# Patient Record
Sex: MALE | Race: BLACK OR AFRICAN AMERICAN | NOT HISPANIC OR LATINO | URBAN - METROPOLITAN AREA
[De-identification: names, ages, dates, MRNs, and addresses within clinical notes are randomized per-mention and may not be internally consistent; named-entity substitution may affect disease eponyms.]

---

## 2019-03-02 ENCOUNTER — EMERGENCY (EMERGENCY)
Facility: HOSPITAL | Age: 64
LOS: 0 days | Discharge: HOME | End: 2019-03-02
Payer: COMMERCIAL

## 2019-03-02 VITALS
WEIGHT: 169.98 LBS | DIASTOLIC BLOOD PRESSURE: 84 MMHG | TEMPERATURE: 98 F | RESPIRATION RATE: 16 BRPM | HEIGHT: 68 IN | OXYGEN SATURATION: 98 % | HEART RATE: 73 BPM | SYSTOLIC BLOOD PRESSURE: 164 MMHG

## 2019-03-02 VITALS
HEART RATE: 70 BPM | OXYGEN SATURATION: 98 % | RESPIRATION RATE: 14 BRPM | DIASTOLIC BLOOD PRESSURE: 75 MMHG | SYSTOLIC BLOOD PRESSURE: 150 MMHG

## 2019-03-02 DIAGNOSIS — L02.811 CUTANEOUS ABSCESS OF HEAD [ANY PART, EXCEPT FACE]: ICD-10-CM

## 2019-03-02 PROCEDURE — 10060 I&D ABSCESS SIMPLE/SINGLE: CPT

## 2019-03-02 PROCEDURE — 99283 EMERGENCY DEPT VISIT LOW MDM: CPT | Mod: 25

## 2019-03-02 NOTE — ED PROVIDER NOTE - CLINICAL SUMMARY MEDICAL DECISION MAKING FREE TEXT BOX
Abscess, patient is diabetic reports blood sugar well controlled at home, 90 at home prior to arrival. Plan for I&D, clindamycin, return in 2 days for wound check.

## 2019-03-02 NOTE — ED ADULT NURSE NOTE - OBJECTIVE STATEMENT
received ft c/o abscess to posterior scalp x 1 week seen at urgent care today with penicillin rx'd and recommended to come to er for I&d denies d/c from site pain worse with palpation

## 2019-03-02 NOTE — ED PROVIDER NOTE - OBJECTIVE STATEMENT
63M history of DM here with posterior scalp.  has not noted any drainage.  No fever, chills, nausea, vomiting. No trauma or injury.  Tetanus is up to date.

## 2019-05-16 ENCOUNTER — INPATIENT (INPATIENT)
Facility: HOSPITAL | Age: 64
LOS: 2 days | Discharge: HOME | End: 2019-05-19
Attending: INTERNAL MEDICINE | Admitting: INTERNAL MEDICINE
Payer: COMMERCIAL

## 2019-05-16 VITALS
HEART RATE: 95 BPM | WEIGHT: 169.98 LBS | SYSTOLIC BLOOD PRESSURE: 125 MMHG | RESPIRATION RATE: 19 BRPM | HEIGHT: 66 IN | TEMPERATURE: 101 F | DIASTOLIC BLOOD PRESSURE: 68 MMHG | OXYGEN SATURATION: 100 %

## 2019-05-16 PROCEDURE — 99285 EMERGENCY DEPT VISIT HI MDM: CPT | Mod: 25

## 2019-05-16 PROCEDURE — 93010 ELECTROCARDIOGRAM REPORT: CPT

## 2019-05-16 NOTE — ED ADULT NURSE NOTE - OBJECTIVE STATEMENT
pt a&o x3, pt states 1 episode of weakness and possible syncopal episode witnessed by wife today after dinner. As per wife " he wasn't responding to me". Pt reported feeling "not we'll". pt states felt dizziness, weakness but at time but did not fall. Pt denies pain at this time, symptoms resolved. Pmh DM, HTN. As per wife pt eyes closed. Pt wife poor historian to situation.

## 2019-05-16 NOTE — ED ADULT NURSE NOTE - CHIEF COMPLAINT QUOTE
BIBEMS due to generalized weakness and change in mental status @ 2100 witness by wife. pt is back to baseline A & O x 4. EMS reports AFIB on cardiac monitor pt on aspirin.

## 2019-05-16 NOTE — ED ADULT NURSE NOTE - NSIMPLEMENTINTERV_GEN_ALL_ED
Implemented All Universal Safety Interventions:  Leon to call system. Call bell, personal items and telephone within reach. Instruct patient to call for assistance. Room bathroom lighting operational. Non-slip footwear when patient is off stretcher. Physically safe environment: no spills, clutter or unnecessary equipment. Stretcher in lowest position, wheels locked, appropriate side rails in place.

## 2019-05-16 NOTE — ED ADULT NURSE NOTE - ED STAT RN HANDOFF DETAILS
general condition remains stable endorsed for continued care NSR on cardiac monitor Handoff given to RN Chela general condition remains stable endorsed for continued care NSR on cardiac monitor

## 2019-05-17 DIAGNOSIS — I48.91 UNSPECIFIED ATRIAL FIBRILLATION: ICD-10-CM

## 2019-05-17 DIAGNOSIS — E78.5 HYPERLIPIDEMIA, UNSPECIFIED: ICD-10-CM

## 2019-05-17 DIAGNOSIS — R91.8 OTHER NONSPECIFIC ABNORMAL FINDING OF LUNG FIELD: ICD-10-CM

## 2019-05-17 DIAGNOSIS — E11.8 TYPE 2 DIABETES MELLITUS WITH UNSPECIFIED COMPLICATIONS: ICD-10-CM

## 2019-05-17 DIAGNOSIS — I10 ESSENTIAL (PRIMARY) HYPERTENSION: ICD-10-CM

## 2019-05-17 PROBLEM — E11.9 TYPE 2 DIABETES MELLITUS WITHOUT COMPLICATIONS: Chronic | Status: ACTIVE | Noted: 2019-03-02

## 2019-05-17 LAB
ALBUMIN SERPL ELPH-MCNC: 3.7 G/DL — SIGNIFICANT CHANGE UP (ref 3.3–5)
ALP SERPL-CCNC: 56 U/L — SIGNIFICANT CHANGE UP (ref 40–120)
ALT FLD-CCNC: 27 U/L — SIGNIFICANT CHANGE UP (ref 12–78)
ANION GAP SERPL CALC-SCNC: 8 MMOL/L — SIGNIFICANT CHANGE UP (ref 5–17)
APTT BLD: 29.8 SEC — SIGNIFICANT CHANGE UP (ref 28.5–37)
AST SERPL-CCNC: 15 U/L — SIGNIFICANT CHANGE UP (ref 15–37)
BASOPHILS # BLD AUTO: 0.04 K/UL — SIGNIFICANT CHANGE UP (ref 0–0.2)
BASOPHILS NFR BLD AUTO: 0.4 % — SIGNIFICANT CHANGE UP (ref 0–2)
BILIRUB SERPL-MCNC: 0.3 MG/DL — SIGNIFICANT CHANGE UP (ref 0.2–1.2)
BUN SERPL-MCNC: 21 MG/DL — SIGNIFICANT CHANGE UP (ref 7–23)
CALCIUM SERPL-MCNC: 9.3 MG/DL — SIGNIFICANT CHANGE UP (ref 8.5–10.1)
CHLORIDE SERPL-SCNC: 111 MMOL/L — HIGH (ref 96–108)
CO2 SERPL-SCNC: 23 MMOL/L — SIGNIFICANT CHANGE UP (ref 22–31)
CREAT SERPL-MCNC: 1.16 MG/DL — SIGNIFICANT CHANGE UP (ref 0.5–1.3)
EOSINOPHIL # BLD AUTO: 0.14 K/UL — SIGNIFICANT CHANGE UP (ref 0–0.5)
EOSINOPHIL NFR BLD AUTO: 1.5 % — SIGNIFICANT CHANGE UP (ref 0–6)
GLUCOSE BLDC GLUCOMTR-MCNC: 144 MG/DL — HIGH (ref 70–99)
GLUCOSE BLDC GLUCOMTR-MCNC: 191 MG/DL — HIGH (ref 70–99)
GLUCOSE BLDC GLUCOMTR-MCNC: 192 MG/DL — HIGH (ref 70–99)
GLUCOSE BLDC GLUCOMTR-MCNC: 195 MG/DL — HIGH (ref 70–99)
GLUCOSE SERPL-MCNC: 137 MG/DL — HIGH (ref 70–99)
HCT VFR BLD CALC: 47.1 % — SIGNIFICANT CHANGE UP (ref 39–50)
HGB BLD-MCNC: 15.3 G/DL — SIGNIFICANT CHANGE UP (ref 13–17)
IMM GRANULOCYTES NFR BLD AUTO: 0.3 % — SIGNIFICANT CHANGE UP (ref 0–1.5)
INR BLD: 1.02 RATIO — SIGNIFICANT CHANGE UP (ref 0.88–1.16)
LYMPHOCYTES # BLD AUTO: 1.93 K/UL — SIGNIFICANT CHANGE UP (ref 1–3.3)
LYMPHOCYTES # BLD AUTO: 20.8 % — SIGNIFICANT CHANGE UP (ref 13–44)
MCHC RBC-ENTMCNC: 28.1 PG — SIGNIFICANT CHANGE UP (ref 27–34)
MCHC RBC-ENTMCNC: 32.5 GM/DL — SIGNIFICANT CHANGE UP (ref 32–36)
MCV RBC AUTO: 86.4 FL — SIGNIFICANT CHANGE UP (ref 80–100)
MONOCYTES # BLD AUTO: 0.62 K/UL — SIGNIFICANT CHANGE UP (ref 0–0.9)
MONOCYTES NFR BLD AUTO: 6.7 % — SIGNIFICANT CHANGE UP (ref 2–14)
NEUTROPHILS # BLD AUTO: 6.54 K/UL — SIGNIFICANT CHANGE UP (ref 1.8–7.4)
NEUTROPHILS NFR BLD AUTO: 70.3 % — SIGNIFICANT CHANGE UP (ref 43–77)
NRBC # BLD: 0 /100 WBCS — SIGNIFICANT CHANGE UP (ref 0–0)
NT-PROBNP SERPL-SCNC: 436 PG/ML — HIGH (ref 0–125)
PLATELET # BLD AUTO: 206 K/UL — SIGNIFICANT CHANGE UP (ref 150–400)
POTASSIUM SERPL-MCNC: 4.3 MMOL/L — SIGNIFICANT CHANGE UP (ref 3.5–5.3)
POTASSIUM SERPL-SCNC: 4.3 MMOL/L — SIGNIFICANT CHANGE UP (ref 3.5–5.3)
PROT SERPL-MCNC: 7 GM/DL — SIGNIFICANT CHANGE UP (ref 6–8.3)
PROTHROM AB SERPL-ACNC: 11.4 SEC — SIGNIFICANT CHANGE UP (ref 10–12.9)
RBC # BLD: 5.45 M/UL — SIGNIFICANT CHANGE UP (ref 4.2–5.8)
RBC # FLD: 14.3 % — SIGNIFICANT CHANGE UP (ref 10.3–14.5)
SODIUM SERPL-SCNC: 142 MMOL/L — SIGNIFICANT CHANGE UP (ref 135–145)
TROPONIN I SERPL-MCNC: <.015 NG/ML — SIGNIFICANT CHANGE UP (ref 0.01–0.04)
WBC # BLD: 9.3 K/UL — SIGNIFICANT CHANGE UP (ref 3.8–10.5)
WBC # FLD AUTO: 9.3 K/UL — SIGNIFICANT CHANGE UP (ref 3.8–10.5)

## 2019-05-17 PROCEDURE — 70450 CT HEAD/BRAIN W/O DYE: CPT | Mod: 26

## 2019-05-17 PROCEDURE — 93880 EXTRACRANIAL BILAT STUDY: CPT | Mod: 26

## 2019-05-17 PROCEDURE — 71275 CT ANGIOGRAPHY CHEST: CPT | Mod: 26

## 2019-05-17 PROCEDURE — 99223 1ST HOSP IP/OBS HIGH 75: CPT

## 2019-05-17 PROCEDURE — 93306 TTE W/DOPPLER COMPLETE: CPT | Mod: 26

## 2019-05-17 RX ORDER — APIXABAN 2.5 MG/1
5 TABLET, FILM COATED ORAL EVERY 12 HOURS
Refills: 0 | Status: DISCONTINUED | OUTPATIENT
Start: 2019-05-17 | End: 2019-05-18

## 2019-05-17 RX ORDER — DEXTROSE 50 % IN WATER 50 %
15 SYRINGE (ML) INTRAVENOUS ONCE
Refills: 0 | Status: DISCONTINUED | OUTPATIENT
Start: 2019-05-17 | End: 2019-05-19

## 2019-05-17 RX ORDER — DORZOLAMIDE HYDROCHLORIDE TIMOLOL MALEATE 20; 5 MG/ML; MG/ML
1 SOLUTION/ DROPS OPHTHALMIC
Qty: 0 | Refills: 0 | DISCHARGE

## 2019-05-17 RX ORDER — DEXTROSE 50 % IN WATER 50 %
25 SYRINGE (ML) INTRAVENOUS ONCE
Refills: 0 | Status: DISCONTINUED | OUTPATIENT
Start: 2019-05-17 | End: 2019-05-19

## 2019-05-17 RX ORDER — RAMIPRIL 5 MG
1 CAPSULE ORAL
Qty: 0 | Refills: 0 | DISCHARGE

## 2019-05-17 RX ORDER — DORZOLAMIDE HYDROCHLORIDE TIMOLOL MALEATE 20; 5 MG/ML; MG/ML
1 SOLUTION/ DROPS OPHTHALMIC
Refills: 0 | Status: DISCONTINUED | OUTPATIENT
Start: 2019-05-17 | End: 2019-05-19

## 2019-05-17 RX ORDER — LATANOPROST 0.05 MG/ML
1 SOLUTION/ DROPS OPHTHALMIC; TOPICAL AT BEDTIME
Refills: 0 | Status: DISCONTINUED | OUTPATIENT
Start: 2019-05-17 | End: 2019-05-19

## 2019-05-17 RX ORDER — GLUCAGON INJECTION, SOLUTION 0.5 MG/.1ML
1 INJECTION, SOLUTION SUBCUTANEOUS ONCE
Refills: 0 | Status: DISCONTINUED | OUTPATIENT
Start: 2019-05-17 | End: 2019-05-19

## 2019-05-17 RX ORDER — ATORVASTATIN CALCIUM 80 MG/1
10 TABLET, FILM COATED ORAL AT BEDTIME
Refills: 0 | Status: DISCONTINUED | OUTPATIENT
Start: 2019-05-17 | End: 2019-05-19

## 2019-05-17 RX ORDER — SODIUM CHLORIDE 9 MG/ML
1000 INJECTION, SOLUTION INTRAVENOUS
Refills: 0 | Status: DISCONTINUED | OUTPATIENT
Start: 2019-05-17 | End: 2019-05-19

## 2019-05-17 RX ORDER — RAMIPRIL 5 MG
0 CAPSULE ORAL
Qty: 0 | Refills: 0 | DISCHARGE

## 2019-05-17 RX ORDER — ERGOCALCIFEROL 1.25 MG/1
1 CAPSULE ORAL
Qty: 0 | Refills: 0 | DISCHARGE

## 2019-05-17 RX ORDER — ASPIRIN/CALCIUM CARB/MAGNESIUM 324 MG
81 TABLET ORAL DAILY
Refills: 0 | Status: DISCONTINUED | OUTPATIENT
Start: 2019-05-17 | End: 2019-05-19

## 2019-05-17 RX ORDER — SODIUM CHLORIDE 9 MG/ML
1000 INJECTION INTRAMUSCULAR; INTRAVENOUS; SUBCUTANEOUS ONCE
Refills: 0 | Status: COMPLETED | OUTPATIENT
Start: 2019-05-17 | End: 2019-05-17

## 2019-05-17 RX ORDER — LATANOPROST 0.05 MG/ML
1 SOLUTION/ DROPS OPHTHALMIC; TOPICAL
Qty: 0 | Refills: 0 | DISCHARGE

## 2019-05-17 RX ORDER — INSULIN LISPRO 100/ML
VIAL (ML) SUBCUTANEOUS
Refills: 0 | Status: DISCONTINUED | OUTPATIENT
Start: 2019-05-17 | End: 2019-05-19

## 2019-05-17 RX ORDER — ENOXAPARIN SODIUM 100 MG/ML
40 INJECTION SUBCUTANEOUS EVERY 24 HOURS
Refills: 0 | Status: DISCONTINUED | OUTPATIENT
Start: 2019-05-17 | End: 2019-05-17

## 2019-05-17 RX ORDER — DEXTROSE 50 % IN WATER 50 %
12.5 SYRINGE (ML) INTRAVENOUS ONCE
Refills: 0 | Status: DISCONTINUED | OUTPATIENT
Start: 2019-05-17 | End: 2019-05-19

## 2019-05-17 RX ORDER — ASPIRIN/CALCIUM CARB/MAGNESIUM 324 MG
1 TABLET ORAL
Qty: 0 | Refills: 0 | DISCHARGE

## 2019-05-17 RX ORDER — LISINOPRIL 2.5 MG/1
40 TABLET ORAL DAILY
Refills: 0 | Status: DISCONTINUED | OUTPATIENT
Start: 2019-05-17 | End: 2019-05-19

## 2019-05-17 RX ORDER — RAMIPRIL 5 MG
10 CAPSULE ORAL
Qty: 0 | Refills: 0 | DISCHARGE

## 2019-05-17 RX ADMIN — ENOXAPARIN SODIUM 40 MILLIGRAM(S): 100 INJECTION SUBCUTANEOUS at 10:45

## 2019-05-17 RX ADMIN — LATANOPROST 1 DROP(S): 0.05 SOLUTION/ DROPS OPHTHALMIC; TOPICAL at 21:41

## 2019-05-17 RX ADMIN — ATORVASTATIN CALCIUM 10 MILLIGRAM(S): 80 TABLET, FILM COATED ORAL at 21:41

## 2019-05-17 RX ADMIN — Medication 1: at 10:55

## 2019-05-17 RX ADMIN — Medication 1: at 16:44

## 2019-05-17 RX ADMIN — Medication 81 MILLIGRAM(S): at 10:45

## 2019-05-17 RX ADMIN — DORZOLAMIDE HYDROCHLORIDE TIMOLOL MALEATE 1 DROP(S): 20; 5 SOLUTION/ DROPS OPHTHALMIC at 17:10

## 2019-05-17 RX ADMIN — APIXABAN 5 MILLIGRAM(S): 2.5 TABLET, FILM COATED ORAL at 17:28

## 2019-05-17 RX ADMIN — LISINOPRIL 40 MILLIGRAM(S): 2.5 TABLET ORAL at 10:54

## 2019-05-17 RX ADMIN — SODIUM CHLORIDE 1000 MILLILITER(S): 9 INJECTION INTRAMUSCULAR; INTRAVENOUS; SUBCUTANEOUS at 03:22

## 2019-05-17 NOTE — H&P ADULT - ASSESSMENT
Patient is a 63 y/o M PMHx DM2, HTN, HLD, Prostate CA (s/p RT only in 2016), Glaucoma presents with sudden onset of weakness/SOB which occurred last night and found to be in Afib here

## 2019-05-17 NOTE — H&P ADULT - NSICDXPASTMEDICALHX_GEN_ALL_CORE_FT
PAST MEDICAL HISTORY:  Diabetes     Glaucoma     HTN (hypertension)     Hyperlipidemia     Prostate cancer

## 2019-05-17 NOTE — ED PROVIDER NOTE - PHYSICAL EXAMINATION
Gen: Alert, NAD, well appearing  Head: NC, AT, PERRL, EOMI, normal lids/conjunctiva  ENT: normal hearing, patent oropharynx without erythema/exudate, uvula midline  Neck: +supple, no tenderness/meningismus/JVD, +Trachea midline  Pulm: Bilateral BS, normal resp effort, no wheeze/stridor/retractions  CV: irregular, no M/R/G, +dist pulses  Abd: soft, NT/ND, +BS, no palpable masses  Mskel: no edema/erythema/cyanosis  Skin: no rash, warm/dry  Neuro: AAOx3, no apparent sensory/motor deficits, coordination intact

## 2019-05-17 NOTE — H&P ADULT - NSHPLABSRESULTS_GEN_ALL_CORE
15.3   9.30  )-----------( 206      ( 17 May 2019 03:24 )             47.1     05-17    142  |  111<H>  |  21  ----------------------------<  137<H>  4.3   |  23  |  1.16    Ca    9.3      17 May 2019 04:09    TPro  7.0  /  Alb  3.7  /  TBili  0.3  /  DBili  x   /  AST  15  /  ALT  27  /  AlkPhos  56  05-17    CARDIAC MARKERS ( 17 May 2019 04:09 )  <.015 ng/mL / x     / x     / x     / x              PT/INR - ( 17 May 2019 04:09 )   PT: 11.4 sec;   INR: 1.02 ratio         PTT - ( 17 May 2019 04:09 )  PTT:29.8 sec    Labs are notable for: CBC, Coags, CMP all unremarkable, Troponin negative,  mildly elevated and not significant given lack of symptoms    CTA Chest - no pe and incidental findings: 4mm RLL and 4mm LLL nodules, mild atherosclerotic changes in aorta and coronaries, DJD spine, bochdalek hernia - all results d/w pt and advised he f/u with his PMD for these findings and to make sure he follows up on the lung nodules    CT head - no acute findings, carotid athersclerotic changes 15.3   9.30  )-----------( 206      ( 17 May 2019 03:24 )             47.1     05-17    142  |  111<H>  |  21  ----------------------------<  137<H>  4.3   |  23  |  1.16    Ca    9.3      17 May 2019 04:09    TPro  7.0  /  Alb  3.7  /  TBili  0.3  /  DBili  x   /  AST  15  /  ALT  27  /  AlkPhos  56  05-17    CARDIAC MARKERS ( 17 May 2019 04:09 )  <.015 ng/mL / x     / x     / x     / x              PT/INR - ( 17 May 2019 04:09 )   PT: 11.4 sec;   INR: 1.02 ratio         PTT - ( 17 May 2019 04:09 )  PTT:29.8 sec    Labs are notable for: CBC, Coags, CMP all unremarkable, Troponin negative,  mildly elevated and not significant given lack of symptoms    CTA Chest - no pe and incidental findings: 4mm RLL and 4mm LLL nodules, mild atherosclerotic changes in aorta and coronaries, DJD spine, bochdalek hernia - all results d/w pt and advised he f/u with his PMD for these findings and to make sure he follows up on the lung nodules    CT head - no acute findings, carotid atherosclerotic changes    EKG: Afib /min

## 2019-05-17 NOTE — PATIENT PROFILE ADULT - STATED REASON FOR ADMISSION
fter yesterday after I had dinner I headed downstairs I FELT FAINTISH AND SHORT OF BREATH AND OUT OF IT. WHEN THEY DID WHAT THEY DID THEY SAW MY HEART WAS BEATING IRREGULA

## 2019-05-17 NOTE — H&P ADULT - NSHPPHYSICALEXAM_GEN_ALL_CORE
PHYSICAL EXAM:    T(C): 36.6 (05-17-19 @ 07:19), Max: 38.1 (05-16-19 @ 22:16)  HR: 98 (05-17-19 @ 07:19) (90 - 108)  BP: 129/82 (05-17-19 @ 07:19) (110/66 - 129/82)  RR: 15 (05-17-19 @ 07:19) (15 - 19)  SpO2: 98% (05-17-19 @ 07:19) (98% - 100%)  Wt(kg): --    Constitutional: NAD     Eyes: PERRLA, Normal sclera/conjunctiva    Neck: supple no JVD, no palpable adenopathy    Breasts: deferred    Back: unremarkable    Respiratory: CTA B/L, no wheezing, or rhonci    Cardiovascular: S1, S2 no murmurs, rubs or gallops, Radial pulses +2 bilaterally    Gastrointestinal: soft, non-tender, + Bowel sounds, no rebound or guarding    Genitourinary: deferred    Rectal: deferred    Extremities: non-tender, no edema, no calf tenderness bilaterally    Neurological: AAO x 3 no focal deficits    Skin: no rashes noted    Musculoskeletal: normal ROM grossly    Psychiatric: appropriate affect, normal speech

## 2019-05-17 NOTE — H&P ADULT - PROBLEM SELECTOR PLAN 5
incidentally noted on CTA chest, pt advised to get the report of the CT and see his PMD for a repeat CT chest to monitor these and pt stated he will do so

## 2019-05-17 NOTE — CONSULT NOTE ADULT - SUBJECTIVE AND OBJECTIVE BOX
CHIEF COMPLAINT:  Patient is a 64y old  Male who presents with a chief complaint of weakness/SOB (17 May 2019 10:20)      HPI:  Patient is a 63 y/o M DM2, HTN, HLD, Prostate CA (s/p RT only in 2016), Glaucoma presents with sudden onset of weakness/SOB which occurred last night. Pt did not feel any CP or palpitations. When he came to the ED he was found to be in AFIB with RVR to 116 which improved spontaneously to NSR on the monitor without any rate controlling agent and he was treated with IVF only. Now feels well and reports no symptoms. He denied ever having a cardiac workup in the past apart from EKGs which he states were normal in the past. Pt denied CP or SOB on exertion and denied palpitations.     ALLERGIES:  No Known Allergies    Intolerances  lactose (Diarrhea)    Home Medications:  aspirin 81 mg oral tablet: 1 tab(s) orally once a day (17 May 2019 08:11)  glyburide-metformin: 1  orally once a day (17 May 2019 08:11)  latanoprost 0.005% ophthalmic solution: 1 drop(s) to each affected eye once a day (in the evening) (17 May 2019 08:11)  pravastatin 20 mg oral tablet: 1 tab(s) orally once a day (17 May 2019 10:19)  ramipril: 1  orally once a day (17 May 2019 08:11)  timolol-dorzolamide 0.5%-2% preservative-free ophthalmic solution: 1 drop(s) to each affected eye 2 times a day (17 May 2019 08:11)  Vitamin D2 2000 intl units oral capsule: 1 cap(s) orally once a day (17 May 2019 08:11)        PAST MEDICAL & SURGICAL HISTORY:  Glaucoma  Prostate cancer  Hyperlipidemia  HTN (hypertension)  Diabetes  No significant past surgical history      FAMILY HISTORY:  Family history of cardiac disorder: mother      SOCIAL HISTORY:  denied any tobacco, he reports "very little" ETOH and denied any drug use    REVIEW OF SYSTEMS:  General:  No wt loss, fevers, chills, night sweats  Eyes:  Good vision, no reported pain  ENT:  No sore throat, pain, runny nose, dysphagia  CV:  No pain, palpitations, hypo/hypertension  Resp:  No dyspnea, cough, tachypnea, wheezing  GI:  No pain, nausea, vomiting, diarrhea, constipation  :  No pain, bleeding, incontinence, nocturia  Muscle:  No pain, weakness  Breast:  No pain, abscess, mass, discharge  Neuro:  No weakness, tingling, memory problems  Psych:  No fatigue, insomnia, mood problems, depression  Endocrine:  No polyuria, polydipsia, cold/heat intolerance  Heme:  No petechiae, ecchymosis, easy bruisability  Skin:  No rash, edema    PHYSICAL EXAM:  Vital Signs:  Vital Signs Last 24 Hrs  T(C): 36.8 (17 May 2019 12:49), Max: 38.1 (16 May 2019 22:16)  T(F): 98.3 (17 May 2019 12:49), Max: 100.5 (16 May 2019 22:16)  HR: 76 (17 May 2019 12:49) (76 - 108)  BP: 126/84 (17 May 2019 12:49) (110/66 - 134/75)  RR: 18 (17 May 2019 12:49) (15 - 19)  SpO2: 98% (17 May 2019 12:49) (98% - 100%)  I&O's Summary      Tele: SR  General:  Appears stated age, well-groomed, well-nourished, no distress  HEENT:  NC/AT, patent nares w/ pink mucosa, OP clear w/o lesions, PERRL, EOMI, conjunctivae clear, no thyromegaly, nodules, adenopathy, no JVD  Chest:  Full & symmetric excursion, no increased effort, breath sounds clear  Cardiovascular:  Regular rhythm, S1, S2, no murmur  Abdomen:  Soft, non-tender, non-distended, normoactive bowel sounds  Extremities:  no edema  Skin:  No rash. skin is warm/dry  Musculoskeletal:  Full ROM in all joints w/o swelling/tenderness/effusion  Neuro/Psych:  Alert, oriented  LABORATORY:                          15.3   9.30  )-----------( 206      ( 17 May 2019 03:24 )             47.1     05-17    142  |  111<H>  |  21  ----------------------------<  137<H>  4.3   |  23  |  1.16    Ca    9.3      17 May 2019 04:09    TPro  7.0  /  Alb  3.7  /  TBili  0.3  /  DBili  x   /  AST  15  /  ALT  27  /  AlkPhos  56  05-17      CARDIAC MARKERS ( 17 May 2019 12:45 )  <.015 ng/mL / x     / x     / x     / x      CARDIAC MARKERS ( 17 May 2019 04:09 )  <.015 ng/mL / x     / x     / x     / x          CAPILLARY BLOOD GLUCOSE  POCT Blood Glucose.: 191 mg/dL (17 May 2019 16:01)  POCT Blood Glucose.: 195 mg/dL (17 May 2019 10:41)  POCT Blood Glucose.: 144 mg/dL (17 May 2019 07:25)    LIVER FUNCTIONS - ( 17 May 2019 04:09 )  Alb: 3.7 g/dL / Pro: 7.0 gm/dL / ALK PHOS: 56 U/L / ALT: 27 U/L / AST: 15 U/L / GGT: x           PT/INR - ( 17 May 2019 04:09 )   PT: 11.4 sec;   INR: 1.02 ratio         PTT - ( 17 May 2019 04:09 )  PTT:29.8 sec    BNPSerum Pro-Brain Natriuretic Peptide: 436 pg/mL (05-17-19 @ 04:09)      IMAGING:  < from: 12 Lead ECG (05.16.19 @ 22:24) >    Diagnosis Line Atrial fibrillation with rapid ventricular response  Abnormal ECG  No previous ECGs available    < end of copied text >    < from: US Duplex Carotid Arteries Complete, Bilateral (05.17.19 @ 12:55) >  No duplex evidence of hemodynamically significant internal carotid artery   stenosis.      < end of copied text >      < from: CT Angio Chest w/ IV Cont (05.17.19 @ 06:23) >  No pulmonary embolism; distal subsegmental branches at the lung bases   degraded by motion.    No acute parenchymal or pleural lung disease.    < end of copied text >    < from: CT Head No Cont (05.17.19 @ 06:21) >  No acute intracranial hemorrhage, mass effect, or CT evidence of an acute   vascular territorial infarct.    < end of copied text >      ASSESSMENT:  Patient is a 63 y/o M DM2, HTN, HLD, Prostate CA (s/p RT only in 2016), Glaucoma presents with sudden onset of weakness/SOB which occurred last night. Pt did not feel any CP or palpitations. When he came to the ED he was found to be in AFIB with RVR to 116 which improved spontaneously to NSR on the monitor without any rate controlling agent and he was treated with IVF only. Now feels well and reports no symptoms. He denied ever having a cardiac workup in the past apart from EKGs which he states were normal in the past. Pt denied CP or SOB on exertion and denied palpitations.     PLAN:     MEDICATIONS  (STANDING):  aspirin  chewable 81 milliGRAM(s) Oral daily  atorvastatin 10 milliGRAM(s) Oral at bedtime  dorzolamide 2%/timolol 0.5% Ophthalmic Solution 1 Drop(s) Both EYES two times a day  enoxaparin Injectable 40 milliGRAM(s) SubCutaneous every 24 hours  insulin lispro (HumaLOG) corrective regimen sliding scale   SubCutaneous three times a day before meals  latanoprost 0.005% Ophthalmic Solution 1 Drop(s) Both EYES at bedtime  lisinopril 40 milliGRAM(s) Oral daily    Monitor heart rhythm on Tele.  echo pending.  Pt likely will benefit from stroke risk reduction with staring NOAC.    Lennox Yanez MD, FACC, FASE, FASNC, FACP  Director, Heart Failure Services  [facility]  , Department of Cardiology  [facility] CHIEF COMPLAINT:  Patient is a 64y old  Male who presents with a chief complaint of weakness/SOB (17 May 2019 10:20)      HPI:  Patient is a 65 y/o M DM2, HTN, HLD, Prostate CA (s/p RT only in 2016), Glaucoma presents with sudden onset of weakness/SOB which occurred last night. Pt did not feel any CP or palpitations. When he came to the ED he was found to be in AFIB with RVR to 116 which improved spontaneously to NSR on the monitor without any rate controlling agent and he was treated with IVF only. Now feels well and reports no symptoms. He denied ever having a cardiac workup in the past apart from EKGs which he states were normal in the past. Pt denied CP or SOB on exertion and denied palpitations. May have had similar type episode a few years ago.    ALLERGIES:  No Known Allergies    Intolerances  lactose (Diarrhea)    Home Medications:  aspirin 81 mg oral tablet: 1 tab(s) orally once a day (17 May 2019 08:11)  glyburide-metformin: 1  orally once a day (17 May 2019 08:11)  latanoprost 0.005% ophthalmic solution: 1 drop(s) to each affected eye once a day (in the evening) (17 May 2019 08:11)  pravastatin 20 mg oral tablet: 1 tab(s) orally once a day (17 May 2019 10:19)  ramipril: 1  orally once a day (17 May 2019 08:11)  timolol-dorzolamide 0.5%-2% preservative-free ophthalmic solution: 1 drop(s) to each affected eye 2 times a day (17 May 2019 08:11)  Vitamin D2 2000 intl units oral capsule: 1 cap(s) orally once a day (17 May 2019 08:11)        PAST MEDICAL & SURGICAL HISTORY:  Glaucoma  Prostate cancer  Hyperlipidemia  HTN (hypertension)  Diabetes  No significant past surgical history      FAMILY HISTORY:  Family history of cardiac disorder: mother      SOCIAL HISTORY:  denied any tobacco, he reports "very little" ETOH and denied any drug use    REVIEW OF SYSTEMS:  General:  No wt loss, fevers, chills, night sweats  Eyes:  Good vision, no reported pain  ENT:  No sore throat, pain, runny nose, dysphagia  CV:  No pain, palpitations, hypo/hypertension  Resp:  No dyspnea, cough, tachypnea, wheezing  GI:  No pain, nausea, vomiting, diarrhea, constipation  :  No pain, bleeding, incontinence, nocturia  Muscle:  No pain, weakness  Breast:  No pain, abscess, mass, discharge  Neuro:  No weakness, tingling, memory problems  Psych:  No fatigue, insomnia, mood problems, depression  Endocrine:  No polyuria, polydipsia, cold/heat intolerance  Heme:  No petechiae, ecchymosis, easy bruisability  Skin:  No rash, edema    PHYSICAL EXAM:  Vital Signs:  Vital Signs Last 24 Hrs  T(C): 36.8 (17 May 2019 12:49), Max: 38.1 (16 May 2019 22:16)  T(F): 98.3 (17 May 2019 12:49), Max: 100.5 (16 May 2019 22:16)  HR: 76 (17 May 2019 12:49) (76 - 108)  BP: 126/84 (17 May 2019 12:49) (110/66 - 134/75)  RR: 18 (17 May 2019 12:49) (15 - 19)  SpO2: 98% (17 May 2019 12:49) (98% - 100%)  I&O's Summary      Tele: SR  General:  Appears stated age, well-groomed, well-nourished, no distress  HEENT:  NC/AT, patent nares w/ pink mucosa, OP clear w/o lesions, PERRL, EOMI, conjunctivae clear, no thyromegaly, nodules, adenopathy, no JVD  Chest:  Full & symmetric excursion, no increased effort, breath sounds clear  Cardiovascular:  Regular rhythm, S1, S2, no murmur  Abdomen:  Soft, non-tender, non-distended, normoactive bowel sounds  Extremities:  no edema  Skin:  No rash. skin is warm/dry  Musculoskeletal:  Full ROM in all joints w/o swelling/tenderness/effusion  Neuro/Psych:  Alert, oriented  LABORATORY:                          15.3   9.30  )-----------( 206      ( 17 May 2019 03:24 )             47.1     05-17    142  |  111<H>  |  21  ----------------------------<  137<H>  4.3   |  23  |  1.16    Ca    9.3      17 May 2019 04:09    TPro  7.0  /  Alb  3.7  /  TBili  0.3  /  DBili  x   /  AST  15  /  ALT  27  /  AlkPhos  56  05-17      CARDIAC MARKERS ( 17 May 2019 12:45 )  <.015 ng/mL / x     / x     / x     / x      CARDIAC MARKERS ( 17 May 2019 04:09 )  <.015 ng/mL / x     / x     / x     / x          CAPILLARY BLOOD GLUCOSE  POCT Blood Glucose.: 191 mg/dL (17 May 2019 16:01)  POCT Blood Glucose.: 195 mg/dL (17 May 2019 10:41)  POCT Blood Glucose.: 144 mg/dL (17 May 2019 07:25)    LIVER FUNCTIONS - ( 17 May 2019 04:09 )  Alb: 3.7 g/dL / Pro: 7.0 gm/dL / ALK PHOS: 56 U/L / ALT: 27 U/L / AST: 15 U/L / GGT: x           PT/INR - ( 17 May 2019 04:09 )   PT: 11.4 sec;   INR: 1.02 ratio         PTT - ( 17 May 2019 04:09 )  PTT:29.8 sec    BNPSerum Pro-Brain Natriuretic Peptide: 436 pg/mL (05-17-19 @ 04:09)      IMAGING:  < from: 12 Lead ECG (05.16.19 @ 22:24) >    Diagnosis Line Atrial fibrillation with rapid ventricular response  Abnormal ECG  No previous ECGs available    < end of copied text >    < from: US Duplex Carotid Arteries Complete, Bilateral (05.17.19 @ 12:55) >  No duplex evidence of hemodynamically significant internal carotid artery   stenosis.      < end of copied text >      < from: CT Angio Chest w/ IV Cont (05.17.19 @ 06:23) >  No pulmonary embolism; distal subsegmental branches at the lung bases   degraded by motion.    No acute parenchymal or pleural lung disease.    < end of copied text >    < from: CT Head No Cont (05.17.19 @ 06:21) >  No acute intracranial hemorrhage, mass effect, or CT evidence of an acute   vascular territorial infarct.    < end of copied text >      ASSESSMENT:  Patient is a 65 y/o M DM2, HTN, HLD, Prostate CA (s/p RT only in 2016), Glaucoma presents with sudden onset of weakness/SOB which occurred last night. Pt did not feel any CP or palpitations. When he came to the ED he was found to be in AFIB with RVR to 116 which improved spontaneously to NSR on the monitor without any rate controlling agent and he was treated with IVF only. Now feels well and reports no symptoms. He denied ever having a cardiac workup in the past apart from EKGs which he states were normal in the past. Pt denied CP or SOB on exertion and denied palpitations. Likely elevated risk of PAF going forward. Explained to pt and his sister at bedside.    PLAN:     MEDICATIONS  (STANDING):  aspirin  chewable 81 milliGRAM(s) Oral daily  atorvastatin 10 milliGRAM(s) Oral at bedtime  dorzolamide 2%/timolol 0.5% Ophthalmic Solution 1 Drop(s) Both EYES two times a day  enoxaparin Injectable 40 milliGRAM(s) SubCutaneous every 24 hours  insulin lispro (HumaLOG) corrective regimen sliding scale   SubCutaneous three times a day before meals  latanoprost 0.005% Ophthalmic Solution 1 Drop(s) Both EYES at bedtime  lisinopril 40 milliGRAM(s) Oral daily    Monitor heart rhythm on Tele.  echo pending.  Pt likely will benefit from stroke risk reduction with staring NOAC.  Will add Eliquis 5 mg BID  Risks/benefits/alternatives explained and he verbalized understanding and agreement.    Lennox Yanez MD, FACC, SYBIL, YARELY, FACP  Director, Heart Failure Services  [facility]  , Department of Cardiology  [facility]

## 2019-05-17 NOTE — H&P ADULT - NSHPREVIEWOFSYSTEMS_GEN_ALL_CORE
REVIEW OF SYSTEMS:    CONSTITUTIONAL:  No fever, chills, +significant weakness which is now improved  HEENT:  Eyes:  No pain or redness in the eyes. Ears, Nose, Throat:  No runny nose or sore throat.  SKIN:  No rash   CARDIOVASCULAR:  No chest pain, chest pressure or chest discomfort. No palpitations or edema.  RESPIRATORY:  No shortness of breath now, no cough or sputum, no wheezing  GASTROINTESTINAL:  No nausea, vomiting or diarrhea. No abdominal pain  GENITOURINARY:  No Burning on urination.  NEUROLOGICAL:  No dizziness, or syncope  MUSCULOSKELETAL:  No joint pain or swelling.  HEMATOLOGIC:  No bleeding or bruising.  PSYCHIATRIC:  No significant depression

## 2019-05-17 NOTE — ED PROVIDER NOTE - CLINICAL SUMMARY MEDICAL DECISION MAKING FREE TEXT BOX
Patient with transient altered mental status and shortness of breath.  Lab values reviewed, there are no values which require acute intervention.  CT imaging negative for CVA or PE.  Has new onset atrial fib.  Patient is to be admitted to the hospital and the case was discussed with the admitting physician.  Any changes in plan, additional imaging/labs, and further work up will be at the discretion of the admitting physician.

## 2019-05-17 NOTE — H&P ADULT - PROBLEM SELECTOR PLAN 1
pt's rate is currently spontaneously well controlled and he is hemodynamically stable  -CHADs-VASC score is 2  -Cardiology consult already called await cardiology regarding need for anti-coagulation and any rate controlling agent  -check TTE and Carotid ultrasound (given carotid atherosclerotic changes seen on CT head)  -monitor on Tele and trend Marcell

## 2019-05-17 NOTE — ED PROVIDER NOTE - OBJECTIVE STATEMENT
Pertinent PMH/PSH/FHx/SHx and Review of Systems contained within:  Patient presents to the ED for weakness.  Patient had eaten dinner and was about to go down the stairs when he felt sudden onset diffuse weakness and shortness of breath.  Patient sat down, was noted to have shallow breathing, wife was trying to talk to him, he was awake but not responsive.  Patient does not recall being unresponsive.  Episode lasted about 5 minutes, patient is now back to baseline denying headache, focal numbness, chest pain, palpitations, shortness of breath, leg swelling.  No recent fever or illness.  Patient noted to have atrial fib in ER, there is no prior history.  Patient denies EtOH/tobacco/illicit substance use.    ROS: No fever/chills, No headache/photophobia/eye pain/changes in vision, No ear pain/sore throat/dysphagia, No chest pain/palpitations, no CURRENT SOB/cough/wheeze/stridor, No abdominal pain, No N/V/D/melena, no dysuria/frequency/discharge, No neck/back pain, no rash, no changes in neurological status/function.

## 2019-05-17 NOTE — ED ADULT NURSE REASSESSMENT NOTE - NS ED NURSE REASSESS COMMENT FT1
Recv'd patient laying in bed. IV access on right forearm # gauge. V/S stable, will continue to monitor.

## 2019-05-18 LAB
ALBUMIN SERPL ELPH-MCNC: 3.5 G/DL — SIGNIFICANT CHANGE UP (ref 3.3–5)
ALP SERPL-CCNC: 54 U/L — SIGNIFICANT CHANGE UP (ref 40–120)
ALT FLD-CCNC: 22 U/L — SIGNIFICANT CHANGE UP (ref 12–78)
ANION GAP SERPL CALC-SCNC: 10 MMOL/L — SIGNIFICANT CHANGE UP (ref 5–17)
AST SERPL-CCNC: 13 U/L — LOW (ref 15–37)
BASOPHILS # BLD AUTO: 0.03 K/UL — SIGNIFICANT CHANGE UP (ref 0–0.2)
BASOPHILS NFR BLD AUTO: 0.5 % — SIGNIFICANT CHANGE UP (ref 0–2)
BILIRUB SERPL-MCNC: 0.8 MG/DL — SIGNIFICANT CHANGE UP (ref 0.2–1.2)
BUN SERPL-MCNC: 18 MG/DL — SIGNIFICANT CHANGE UP (ref 7–23)
CALCIUM SERPL-MCNC: 8.8 MG/DL — SIGNIFICANT CHANGE UP (ref 8.5–10.1)
CHLORIDE SERPL-SCNC: 109 MMOL/L — HIGH (ref 96–108)
CHOLEST SERPL-MCNC: 149 MG/DL — SIGNIFICANT CHANGE UP (ref 10–199)
CO2 SERPL-SCNC: 24 MMOL/L — SIGNIFICANT CHANGE UP (ref 22–31)
CREAT SERPL-MCNC: 1.22 MG/DL — SIGNIFICANT CHANGE UP (ref 0.5–1.3)
EOSINOPHIL # BLD AUTO: 0.17 K/UL — SIGNIFICANT CHANGE UP (ref 0–0.5)
EOSINOPHIL NFR BLD AUTO: 3.1 % — SIGNIFICANT CHANGE UP (ref 0–6)
GLUCOSE BLDC GLUCOMTR-MCNC: 160 MG/DL — HIGH (ref 70–99)
GLUCOSE SERPL-MCNC: 155 MG/DL — HIGH (ref 70–99)
HBA1C BLD-MCNC: 7.4 % — HIGH (ref 4–5.6)
HCT VFR BLD CALC: 44.2 % — SIGNIFICANT CHANGE UP (ref 39–50)
HCV AB S/CO SERPL IA: 0.08 S/CO — SIGNIFICANT CHANGE UP (ref 0–0.99)
HCV AB SERPL-IMP: SIGNIFICANT CHANGE UP
HDLC SERPL-MCNC: 39 MG/DL — LOW
HGB BLD-MCNC: 14.4 G/DL — SIGNIFICANT CHANGE UP (ref 13–17)
IMM GRANULOCYTES NFR BLD AUTO: 0.4 % — SIGNIFICANT CHANGE UP (ref 0–1.5)
INR BLD: 1.13 RATIO — SIGNIFICANT CHANGE UP (ref 0.88–1.16)
LIPID PNL WITH DIRECT LDL SERPL: 89 MG/DL — SIGNIFICANT CHANGE UP
LYMPHOCYTES # BLD AUTO: 1.84 K/UL — SIGNIFICANT CHANGE UP (ref 1–3.3)
LYMPHOCYTES # BLD AUTO: 33.5 % — SIGNIFICANT CHANGE UP (ref 13–44)
MCHC RBC-ENTMCNC: 28.3 PG — SIGNIFICANT CHANGE UP (ref 27–34)
MCHC RBC-ENTMCNC: 32.6 GM/DL — SIGNIFICANT CHANGE UP (ref 32–36)
MCV RBC AUTO: 87 FL — SIGNIFICANT CHANGE UP (ref 80–100)
MONOCYTES # BLD AUTO: 0.47 K/UL — SIGNIFICANT CHANGE UP (ref 0–0.9)
MONOCYTES NFR BLD AUTO: 8.6 % — SIGNIFICANT CHANGE UP (ref 2–14)
NEUTROPHILS # BLD AUTO: 2.96 K/UL — SIGNIFICANT CHANGE UP (ref 1.8–7.4)
NEUTROPHILS NFR BLD AUTO: 53.9 % — SIGNIFICANT CHANGE UP (ref 43–77)
NRBC # BLD: 0 /100 WBCS — SIGNIFICANT CHANGE UP (ref 0–0)
PLATELET # BLD AUTO: 195 K/UL — SIGNIFICANT CHANGE UP (ref 150–400)
POTASSIUM SERPL-MCNC: 4.1 MMOL/L — SIGNIFICANT CHANGE UP (ref 3.5–5.3)
POTASSIUM SERPL-SCNC: 4.1 MMOL/L — SIGNIFICANT CHANGE UP (ref 3.5–5.3)
PROT SERPL-MCNC: 6.6 GM/DL — SIGNIFICANT CHANGE UP (ref 6–8.3)
PROTHROM AB SERPL-ACNC: 12.7 SEC — SIGNIFICANT CHANGE UP (ref 10–12.9)
RBC # BLD: 5.08 M/UL — SIGNIFICANT CHANGE UP (ref 4.2–5.8)
RBC # FLD: 14.5 % — SIGNIFICANT CHANGE UP (ref 10.3–14.5)
SODIUM SERPL-SCNC: 143 MMOL/L — SIGNIFICANT CHANGE UP (ref 135–145)
TOTAL CHOLESTEROL/HDL RATIO MEASUREMENT: 3.8 RATIO — SIGNIFICANT CHANGE UP (ref 3.4–9.6)
TRIGL SERPL-MCNC: 107 MG/DL — SIGNIFICANT CHANGE UP (ref 10–149)
TROPONIN I SERPL-MCNC: <.015 NG/ML — SIGNIFICANT CHANGE UP (ref 0.01–0.04)
WBC # BLD: 5.49 K/UL — SIGNIFICANT CHANGE UP (ref 3.8–10.5)
WBC # FLD AUTO: 5.49 K/UL — SIGNIFICANT CHANGE UP (ref 3.8–10.5)

## 2019-05-18 PROCEDURE — 99233 SBSQ HOSP IP/OBS HIGH 50: CPT

## 2019-05-18 PROCEDURE — 93010 ELECTROCARDIOGRAM REPORT: CPT

## 2019-05-18 RX ORDER — RIVAROXABAN 15 MG-20MG
20 KIT ORAL EVERY 24 HOURS
Refills: 0 | Status: DISCONTINUED | OUTPATIENT
Start: 2019-05-18 | End: 2019-05-19

## 2019-05-18 RX ORDER — RIVAROXABAN 15 MG-20MG
1 KIT ORAL
Qty: 30 | Refills: 0
Start: 2019-05-18 | End: 2019-06-16

## 2019-05-18 RX ORDER — APIXABAN 2.5 MG/1
1 TABLET, FILM COATED ORAL
Qty: 60 | Refills: 0
Start: 2019-05-18 | End: 2019-06-16

## 2019-05-18 RX ADMIN — Medication 1: at 16:39

## 2019-05-18 RX ADMIN — Medication 81 MILLIGRAM(S): at 11:24

## 2019-05-18 RX ADMIN — Medication 1: at 07:22

## 2019-05-18 RX ADMIN — ATORVASTATIN CALCIUM 10 MILLIGRAM(S): 80 TABLET, FILM COATED ORAL at 21:23

## 2019-05-18 RX ADMIN — APIXABAN 5 MILLIGRAM(S): 2.5 TABLET, FILM COATED ORAL at 05:42

## 2019-05-18 RX ADMIN — DORZOLAMIDE HYDROCHLORIDE TIMOLOL MALEATE 1 DROP(S): 20; 5 SOLUTION/ DROPS OPHTHALMIC at 16:39

## 2019-05-18 RX ADMIN — APIXABAN 5 MILLIGRAM(S): 2.5 TABLET, FILM COATED ORAL at 16:39

## 2019-05-18 RX ADMIN — LISINOPRIL 40 MILLIGRAM(S): 2.5 TABLET ORAL at 05:42

## 2019-05-18 RX ADMIN — DORZOLAMIDE HYDROCHLORIDE TIMOLOL MALEATE 1 DROP(S): 20; 5 SOLUTION/ DROPS OPHTHALMIC at 05:42

## 2019-05-18 RX ADMIN — LATANOPROST 1 DROP(S): 0.05 SOLUTION/ DROPS OPHTHALMIC; TOPICAL at 21:22

## 2019-05-18 NOTE — PROGRESS NOTE ADULT - SUBJECTIVE AND OBJECTIVE BOX
HPI:  63 y/o M DM2, HTN, HLD, Prostate CA (s/p RT only in 2016), Glaucoma presented with sudden onset of weakness/SOB. Pt did not feel any CP or palpitations. Found to be in AFIB with RVR which improved spontaneously to NSR on the monitor without any rate controlling agent and he was treated with IVF only. He denied ever having a cardiac workup in the past apart from ECGs which he states were normal in the past. Pt denied CP or SOB on exertion and denied palpitations. May have had similar type episode a few years ago. Feeling better today with no new complaints.      REVIEW OF SYSTEMS:  General:  No wt loss, fevers, chills, night sweats  Eyes:  Good vision, no reported pain  ENT:  No sore throat, pain, runny nose, dysphagia  CV:  No pain, palpitations, hypo/hypertension  Resp:  No dyspnea, cough, tachypnea, wheezing  GI:  No pain, nausea, vomiting, diarrhea, constipation  :  No pain, bleeding, incontinence, nocturia  Muscle:  No pain, weakness  Breast:  No pain, abscess, mass, discharge  Neuro:  No weakness, tingling, memory problems  Psych:  No fatigue, insomnia, mood problems, depression  Endocrine:  No polyuria, polydipsia, cold/heat intolerance  Heme:  No petechiae, ecchymosis, easy bruisability  Skin:  No rash, edema    PHYSICAL EXAM:  Vital Signs Last 24 Hrs  T(C): 36.4 (18 May 2019 12:01), Max: 36.6 (17 May 2019 17:30)  T(F): 97.6 (18 May 2019 12:01), Max: 97.9 (17 May 2019 17:30)  HR: 70 (18 May 2019 12:01) (67 - 76)  BP: 104/68 (18 May 2019 12:01) (104/68 - 113/74)  RR: 16 (18 May 2019 12:01) (16 - 18)  SpO2: 99% (18 May 2019 12:01) (97% - 99%)    Tele: SR  General:  Appears stated age, well-groomed, well-nourished, no distress  HEENT:  NC/AT, patent nares w/ pink mucosa, OP clear w/o lesions, PERRL, EOMI, conjunctivae clear, no thyromegaly, nodules, adenopathy, no JVD  Chest:  Full & symmetric excursion, no increased effort, breath sounds clear  Cardiovascular:  Regular rhythm, S1, S2, no murmur  Abdomen:  Soft, non-tender, non-distended, normoactive bowel sounds  Extremities:  no edema  Skin:  No rash. skin is warm/dry  Musculoskeletal:  Full ROM in all joints w/o swelling/tenderness/effusion  Neuro/Psych:  Alert, oriented    LABORATORY:                          14.4   5.49  )-----------( 195      ( 18 May 2019 07:19 )             44.2     05-18    143  |  109<H>  |  18  ----------------------------<  155<H>  4.1   |  24  |  1.22    Ca    8.8      18 May 2019 07:19    TPro  6.6  /  Alb  3.5  /  TBili  0.8  /  DBili  x   /  AST  13<L>  /  ALT  22  /  AlkPhos  54  05-18        IMAGING:  < from: 12 Lead ECG (05.16.19 @ 22:24) >    Diagnosis Line Atrial fibrillation with rapid ventricular response  Abnormal ECG  No previous ECGs available    < end of copied text >    < from: US Duplex Carotid Arteries Complete, Bilateral (05.17.19 @ 12:55) >  No duplex evidence of hemodynamically significant internal carotid artery   stenosis.      < end of copied text >      < from: CT Angio Chest w/ IV Cont (05.17.19 @ 06:23) >  No pulmonary embolism; distal subsegmental branches at the lung bases   degraded by motion.    No acute parenchymal or pleural lung disease.    < end of copied text >    < from: CT Head No Cont (05.17.19 @ 06:21) >  No acute intracranial hemorrhage, mass effect, or CT evidence of an acute   vascular territorial infarct.    < end of copied text >    < from: TTE Echo Doppler w/o Cont (05.17.19 @ 11:55) >   1. Left ventricular ejection fraction, by visual estimation, is 55 to   60%.   2. Technically fair study.   3. Normal global left ventricular systolic function.   4. Normal left ventricular internal cavity size.   5. Spectral Doppler shows pseudonormal pattern of left ventricular   myocardial filling (Grade II diastolic dysfunction).   6. Normal right ventricular size and function.   7. There is no evidence of pericardial effusion.   8. Mild mitral annular calcification.   9. Mild thickening and calcification of the anterior and posterior   mitral valve leaflets.  10. Trace mitral valve regurgitation.    < end of copied text >    ASSESSMENT:  63 y/o M DM2, HTN, HLD, Prostate CA (s/p RT only in 2016), Glaucoma presented with sudden onset of weakness/SOB. Pt did not feel any CP or palpitations. Found to be in AFIB with RVR which improved spontaneously to NSR on the monitor without any rate controlling agent and he was treated with IVF only. He denied ever having a cardiac workup in the past apart from ECGs which he states were normal in the past. Pt denied CP or SOB on exertion and denied palpitations. May have had similar type episode a few years ago. Feeling better today with no new complaints. Risk of PAF noted.    PLAN:       Tele for now.    MEDICATIONS  (STANDING):  apixaban 5 milliGRAM(s) Oral every 12 hours  aspirin  chewable 81 milliGRAM(s) Oral daily  atorvastatin 10 milliGRAM(s) Oral at bedtime  dorzolamide 2%/timolol 0.5% Ophthalmic Solution 1 Drop(s) Both EYES two times a day  insulin lispro (HumaLOG) corrective regimen sliding scale   SubCutaneous three times a day before meals  latanoprost 0.005% Ophthalmic Solution 1 Drop(s) Both EYES at bedtime  lisinopril 40 milliGRAM(s) Oral daily    Likely d/c home tomorrow if stable clinically.  f/u labs.    Lennox Yanez MD, FACC, FASFEROZ, YARELY, FACP  Director, Heart Failure Services  [facility]  , Department of Cardiology  [facility]
Patient is a 64y old  Male who presents with a chief complaint of weakness/SOB (18 May 2019 16:59)      INTERVAL HPI/OVERNIGHT EVENTS:  Pt was seen and examined, no acute events.    MEDICATIONS  (STANDING):  aspirin  chewable 81 milliGRAM(s) Oral daily  atorvastatin 10 milliGRAM(s) Oral at bedtime  dextrose 5%. 1000 milliLiter(s) (50 mL/Hr) IV Continuous <Continuous>  dextrose 50% Injectable 12.5 Gram(s) IV Push once  dextrose 50% Injectable 25 Gram(s) IV Push once  dextrose 50% Injectable 25 Gram(s) IV Push once  dorzolamide 2%/timolol 0.5% Ophthalmic Solution 1 Drop(s) Both EYES two times a day  insulin lispro (HumaLOG) corrective regimen sliding scale   SubCutaneous three times a day before meals  latanoprost 0.005% Ophthalmic Solution 1 Drop(s) Both EYES at bedtime  lisinopril 40 milliGRAM(s) Oral daily  rivaroxaban 20 milliGRAM(s) Oral every 24 hours    MEDICATIONS  (PRN):  dextrose 40% Gel 15 Gram(s) Oral once PRN Blood Glucose LESS THAN 70 milliGRAM(s)/deciliter  glucagon  Injectable 1 milliGRAM(s) IntraMuscular once PRN Glucose LESS THAN 70 milligrams/deciliter      Allergies  No Known Allergies    Intolerances  lactose (Diarrhea)        Vital Signs Last 24 Hrs  T(C): 36.2 (18 May 2019 16:40), Max: 36.4 (18 May 2019 12:01)  T(F): 97.2 (18 May 2019 16:40), Max: 97.6 (18 May 2019 12:01)  HR: 72 (18 May 2019 16:40) (67 - 72)  BP: 138/80 (18 May 2019 16:40) (104/68 - 138/80)  BP(mean): --  RR: 18 (18 May 2019 16:40) (16 - 18)  SpO2: 98% (18 May 2019 16:40) (98% - 99%)    PHYSICAL EXAM:  GENERAL: NAD  HEAD: Atraumatic   EYES: PERRLA  NERVOUS SYSTEM:  A, O x 3, non focal  CHEST/LUNG: Clear  HEART: RRR  ABDOMEN: Soft, non tender  EXTREMITIES:  no edema      LABS:                        14.4   5.49  )-----------( 195      ( 18 May 2019 07:19 )             44.2     05-18    143  |  109<H>  |  18  ----------------------------<  155<H>  4.1   |  24  |  1.22    Ca    8.8      18 May 2019 07:19    TPro  6.6  /  Alb  3.5  /  TBili  0.8  /  DBili  x   /  AST  13<L>  /  ALT  22  /  AlkPhos  54  05-18    PT/INR - ( 18 May 2019 07:19 )   PT: 12.7 sec;   INR: 1.13 ratio         PTT - ( 17 May 2019 04:09 )  PTT:29.8 sec    CAPILLARY BLOOD GLUCOSE      POCT Blood Glucose.: 177 mg/dL (18 May 2019 16:37)  POCT Blood Glucose.: 121 mg/dL (18 May 2019 11:23)  POCT Blood Glucose.: 160 mg/dL (18 May 2019 07:20)  POCT Blood Glucose.: 192 mg/dL (17 May 2019 21:23)      RADIOLOGY & ADDITIONAL TESTS:    Imaging Personally Reviewed:  [ ] YES  [ ] NO    Consultant(s) Notes Reviewed:  [ ] YES  [ ] NO    Care Discussed with Consultants/Other Providers [ ] YES  [ ] NO

## 2019-05-18 NOTE — PROGRESS NOTE ADULT - PROBLEM SELECTOR PLAN 1
pt's rate is currently spontaneously well controlled and he is hemodynamically stable  CHADs-VASC score is 2  switched Eliquis to Xarelto ( 20$ co pay, eliquis no covered by insurance)  2D echo unremarkable.

## 2019-05-18 NOTE — PROGRESS NOTE ADULT - ASSESSMENT
Patient is a 63 y/o M PMHx DM2, HTN, HLD, Prostate CA (s/p RT only in 2016), Glaucoma presents with sudden onset of weakness/SOB which occurred last night and found to be in Afib here.

## 2019-05-19 VITALS
RESPIRATION RATE: 16 BRPM | OXYGEN SATURATION: 97 % | SYSTOLIC BLOOD PRESSURE: 125 MMHG | TEMPERATURE: 98 F | HEART RATE: 72 BPM | DIASTOLIC BLOOD PRESSURE: 74 MMHG

## 2019-05-19 PROCEDURE — 99239 HOSP IP/OBS DSCHRG MGMT >30: CPT

## 2019-05-19 RX ADMIN — DORZOLAMIDE HYDROCHLORIDE TIMOLOL MALEATE 1 DROP(S): 20; 5 SOLUTION/ DROPS OPHTHALMIC at 05:18

## 2019-05-19 RX ADMIN — Medication 81 MILLIGRAM(S): at 11:58

## 2019-05-19 RX ADMIN — LISINOPRIL 40 MILLIGRAM(S): 2.5 TABLET ORAL at 05:18

## 2019-05-19 RX ADMIN — Medication 1: at 11:58

## 2019-05-19 NOTE — DISCHARGE NOTE PROVIDER - CARE PROVIDER_API CALL
primary doctor,   Phone: (   )    -  Fax: (   )    -  Follow Up Time:     Lennox Yanez)  Internal Medicine; Nuclear Cardiology  [address] [place] [address]  Phone: [phone]  Fax: [phone]  Follow Up Time:

## 2019-05-19 NOTE — DISCHARGE NOTE PROVIDER - NSDCCPCAREPLAN_GEN_ALL_CORE_FT
PRINCIPAL DISCHARGE DIAGNOSIS  Diagnosis: Atrial fibrillation, unspecified type  Assessment and Plan of Treatment: rate controlled.  Continue Xarelto. Follow up with cardiologist      SECONDARY DISCHARGE DIAGNOSES  Diagnosis: Essential hypertension  Assessment and Plan of Treatment: Continue home medication    Diagnosis: Type 2 diabetes mellitus with complication, without long-term current use of insulin  Assessment and Plan of Treatment: Continue home medication    Diagnosis: Lung nodule, multiple  Assessment and Plan of Treatment: 4 mm right lower lobe subpleural nodule    and 4 mm left lower lobe subpleural nodule.  follow up with primary dcotor

## 2019-05-19 NOTE — DISCHARGE NOTE PROVIDER - HOSPITAL COURSE
Patient is a 63 y/o M PMHx DM2, HTN, HLD, Prostate CA (s/p RT only in 2016), Glaucoma presents with sudden onset of weakness/SOB which occurred last night and found to be in Afib here.         Problem/Plan - 1:    ·  Problem: Atrial fibrillation, new onset.  Plan: pt's rate is currently spontaneously well controlled and he is hemodynamically stable    CHADs-VASC score is 2    switched Eliquis to Xarelto ( 20$ co pay, eliquis no covered by insurance)    2D echo unremarkable.          Problem/Plan - 2:    ·  Problem: Essential hypertension.  Plan: cont home meds.          Problem/Plan - 3:    ·  Problem: Hyperlipidemia, unspecified hyperlipidemia type.  Plan: cont low dose statin and repeat Lipid panel to decide if a moderate-high intensity should be done.          Problem/Plan - 4:    ·  Problem: Type 2 diabetes mellitus with complication, without long-term current use of insulin.  Plan: insulin ss.          Problem/Plan - 5:    ·  Problem: Lung nodule, multiple.  Plan: incidentally noted on CTA chest, pt advised to get the report of the CT and see his PMD for a repeat CT chest to monitor these and pt stated he will do so.

## 2019-05-19 NOTE — CHART NOTE - NSCHARTNOTEFT_GEN_A_CORE
[facility]       To Whom It May Concern,     Mr. Kei Orantes was admitted on 5/17/19 , treated and discharged on 5/19/19 from [facility]  If any questions or concerns please call.                   Thank you.  Fransisco Rashid MD  Hospitalist   Department of Medicine  [facility]

## 2019-05-19 NOTE — DISCHARGE NOTE NURSING/CASE MANAGEMENT/SOCIAL WORK - NSDCDPATPORTLINK_GEN_ALL_CORE
You can access the [facility] Portal, offered by [facility], by registering with the following website: [email or web address]

## 2019-05-22 DIAGNOSIS — Z79.84 LONG TERM (CURRENT) USE OF ORAL HYPOGLYCEMIC DRUGS: ICD-10-CM

## 2019-05-22 DIAGNOSIS — I10 ESSENTIAL (PRIMARY) HYPERTENSION: ICD-10-CM

## 2019-05-22 DIAGNOSIS — H40.9 UNSPECIFIED GLAUCOMA: ICD-10-CM

## 2019-05-22 DIAGNOSIS — I48.91 UNSPECIFIED ATRIAL FIBRILLATION: ICD-10-CM

## 2019-05-22 DIAGNOSIS — Z85.46 PERSONAL HISTORY OF MALIGNANT NEOPLASM OF PROSTATE: ICD-10-CM

## 2019-05-22 DIAGNOSIS — R91.8 OTHER NONSPECIFIC ABNORMAL FINDING OF LUNG FIELD: ICD-10-CM

## 2019-05-22 DIAGNOSIS — E11.9 TYPE 2 DIABETES MELLITUS WITHOUT COMPLICATIONS: ICD-10-CM

## 2019-05-22 DIAGNOSIS — Z92.3 PERSONAL HISTORY OF IRRADIATION: ICD-10-CM

## 2019-05-22 DIAGNOSIS — E78.5 HYPERLIPIDEMIA, UNSPECIFIED: ICD-10-CM

## 2019-05-22 DIAGNOSIS — R53.1 WEAKNESS: ICD-10-CM

## 2019-05-22 DIAGNOSIS — Z79.82 LONG TERM (CURRENT) USE OF ASPIRIN: ICD-10-CM

## 2019-11-25 NOTE — ED ADULT TRIAGE NOTE - CHIEF COMPLAINT QUOTE
BIBEMS due to generalized weakness and change in mental status @ 2100 witness by wife. pt is back to baseline A & O x 4. EMS reports AFIB on cardiac monitor pt on aspirin. Dr Canela

## 2023-12-11 ENCOUNTER — EMERGENCY (EMERGENCY)
Facility: HOSPITAL | Age: 68
LOS: 0 days | Discharge: HOME | End: 2023-12-12
Attending: EMERGENCY MEDICINE
Payer: COMMERCIAL

## 2023-12-11 VITALS
RESPIRATION RATE: 17 BRPM | TEMPERATURE: 99 F | WEIGHT: 162.04 LBS | DIASTOLIC BLOOD PRESSURE: 87 MMHG | HEART RATE: 100 BPM | OXYGEN SATURATION: 100 % | HEIGHT: 68 IN | SYSTOLIC BLOOD PRESSURE: 138 MMHG

## 2023-12-11 DIAGNOSIS — R51.9 HEADACHE, UNSPECIFIED: ICD-10-CM

## 2023-12-11 DIAGNOSIS — R42 DIZZINESS AND GIDDINESS: ICD-10-CM

## 2023-12-11 DIAGNOSIS — Z79.01 LONG TERM (CURRENT) USE OF ANTICOAGULANTS: ICD-10-CM

## 2023-12-11 DIAGNOSIS — I10 ESSENTIAL (PRIMARY) HYPERTENSION: ICD-10-CM

## 2023-12-11 DIAGNOSIS — E78.5 HYPERLIPIDEMIA, UNSPECIFIED: ICD-10-CM

## 2023-12-11 DIAGNOSIS — I48.91 UNSPECIFIED ATRIAL FIBRILLATION: ICD-10-CM

## 2023-12-11 DIAGNOSIS — R55 SYNCOPE AND COLLAPSE: ICD-10-CM

## 2023-12-11 DIAGNOSIS — E11.9 TYPE 2 DIABETES MELLITUS WITHOUT COMPLICATIONS: ICD-10-CM

## 2023-12-11 DIAGNOSIS — E73.9 LACTOSE INTOLERANCE, UNSPECIFIED: ICD-10-CM

## 2023-12-11 DIAGNOSIS — H53.8 OTHER VISUAL DISTURBANCES: ICD-10-CM

## 2023-12-11 LAB
ALBUMIN SERPL ELPH-MCNC: 3.9 G/DL — SIGNIFICANT CHANGE UP (ref 3.3–5)
ALBUMIN SERPL ELPH-MCNC: 3.9 G/DL — SIGNIFICANT CHANGE UP (ref 3.3–5)
ALP SERPL-CCNC: 70 U/L — SIGNIFICANT CHANGE UP (ref 40–120)
ALP SERPL-CCNC: 70 U/L — SIGNIFICANT CHANGE UP (ref 40–120)
ALT FLD-CCNC: 23 U/L — SIGNIFICANT CHANGE UP (ref 12–78)
ALT FLD-CCNC: 23 U/L — SIGNIFICANT CHANGE UP (ref 12–78)
ANION GAP SERPL CALC-SCNC: 5 MMOL/L — SIGNIFICANT CHANGE UP (ref 5–17)
ANION GAP SERPL CALC-SCNC: 5 MMOL/L — SIGNIFICANT CHANGE UP (ref 5–17)
APTT BLD: 41.9 SEC — HIGH (ref 24.5–35.6)
APTT BLD: 41.9 SEC — HIGH (ref 24.5–35.6)
AST SERPL-CCNC: 18 U/L — SIGNIFICANT CHANGE UP (ref 15–37)
AST SERPL-CCNC: 18 U/L — SIGNIFICANT CHANGE UP (ref 15–37)
BASOPHILS # BLD AUTO: 0.03 K/UL — SIGNIFICANT CHANGE UP (ref 0–0.2)
BASOPHILS # BLD AUTO: 0.03 K/UL — SIGNIFICANT CHANGE UP (ref 0–0.2)
BASOPHILS NFR BLD AUTO: 0.4 % — SIGNIFICANT CHANGE UP (ref 0–2)
BASOPHILS NFR BLD AUTO: 0.4 % — SIGNIFICANT CHANGE UP (ref 0–2)
BILIRUB SERPL-MCNC: 0.2 MG/DL — SIGNIFICANT CHANGE UP (ref 0.2–1.2)
BILIRUB SERPL-MCNC: 0.2 MG/DL — SIGNIFICANT CHANGE UP (ref 0.2–1.2)
BUN SERPL-MCNC: 26 MG/DL — HIGH (ref 7–23)
BUN SERPL-MCNC: 26 MG/DL — HIGH (ref 7–23)
CALCIUM SERPL-MCNC: 9.4 MG/DL — SIGNIFICANT CHANGE UP (ref 8.5–10.1)
CALCIUM SERPL-MCNC: 9.4 MG/DL — SIGNIFICANT CHANGE UP (ref 8.5–10.1)
CHLORIDE SERPL-SCNC: 108 MMOL/L — SIGNIFICANT CHANGE UP (ref 96–108)
CHLORIDE SERPL-SCNC: 108 MMOL/L — SIGNIFICANT CHANGE UP (ref 96–108)
CO2 SERPL-SCNC: 24 MMOL/L — SIGNIFICANT CHANGE UP (ref 22–31)
CO2 SERPL-SCNC: 24 MMOL/L — SIGNIFICANT CHANGE UP (ref 22–31)
CREAT SERPL-MCNC: 1.31 MG/DL — HIGH (ref 0.5–1.3)
CREAT SERPL-MCNC: 1.31 MG/DL — HIGH (ref 0.5–1.3)
EGFR: 60 ML/MIN/1.73M2 — SIGNIFICANT CHANGE UP
EGFR: 60 ML/MIN/1.73M2 — SIGNIFICANT CHANGE UP
EOSINOPHIL # BLD AUTO: 0.1 K/UL — SIGNIFICANT CHANGE UP (ref 0–0.5)
EOSINOPHIL # BLD AUTO: 0.1 K/UL — SIGNIFICANT CHANGE UP (ref 0–0.5)
EOSINOPHIL NFR BLD AUTO: 1.5 % — SIGNIFICANT CHANGE UP (ref 0–6)
EOSINOPHIL NFR BLD AUTO: 1.5 % — SIGNIFICANT CHANGE UP (ref 0–6)
GLUCOSE SERPL-MCNC: 190 MG/DL — HIGH (ref 70–99)
GLUCOSE SERPL-MCNC: 190 MG/DL — HIGH (ref 70–99)
HCT VFR BLD CALC: 37.1 % — LOW (ref 39–50)
HCT VFR BLD CALC: 37.1 % — LOW (ref 39–50)
HGB BLD-MCNC: 11.9 G/DL — LOW (ref 13–17)
HGB BLD-MCNC: 11.9 G/DL — LOW (ref 13–17)
IMM GRANULOCYTES NFR BLD AUTO: 0.4 % — SIGNIFICANT CHANGE UP (ref 0–0.9)
IMM GRANULOCYTES NFR BLD AUTO: 0.4 % — SIGNIFICANT CHANGE UP (ref 0–0.9)
INR BLD: 1.59 RATIO — HIGH (ref 0.85–1.18)
INR BLD: 1.59 RATIO — HIGH (ref 0.85–1.18)
LYMPHOCYTES # BLD AUTO: 1.09 K/UL — SIGNIFICANT CHANGE UP (ref 1–3.3)
LYMPHOCYTES # BLD AUTO: 1.09 K/UL — SIGNIFICANT CHANGE UP (ref 1–3.3)
LYMPHOCYTES # BLD AUTO: 16.1 % — SIGNIFICANT CHANGE UP (ref 13–44)
LYMPHOCYTES # BLD AUTO: 16.1 % — SIGNIFICANT CHANGE UP (ref 13–44)
MAGNESIUM SERPL-MCNC: 2.1 MG/DL — SIGNIFICANT CHANGE UP (ref 1.6–2.6)
MAGNESIUM SERPL-MCNC: 2.1 MG/DL — SIGNIFICANT CHANGE UP (ref 1.6–2.6)
MCHC RBC-ENTMCNC: 27 PG — SIGNIFICANT CHANGE UP (ref 27–34)
MCHC RBC-ENTMCNC: 27 PG — SIGNIFICANT CHANGE UP (ref 27–34)
MCHC RBC-ENTMCNC: 32.1 G/DL — SIGNIFICANT CHANGE UP (ref 32–36)
MCHC RBC-ENTMCNC: 32.1 G/DL — SIGNIFICANT CHANGE UP (ref 32–36)
MCV RBC AUTO: 84.1 FL — SIGNIFICANT CHANGE UP (ref 80–100)
MCV RBC AUTO: 84.1 FL — SIGNIFICANT CHANGE UP (ref 80–100)
MONOCYTES # BLD AUTO: 0.53 K/UL — SIGNIFICANT CHANGE UP (ref 0–0.9)
MONOCYTES # BLD AUTO: 0.53 K/UL — SIGNIFICANT CHANGE UP (ref 0–0.9)
MONOCYTES NFR BLD AUTO: 7.8 % — SIGNIFICANT CHANGE UP (ref 2–14)
MONOCYTES NFR BLD AUTO: 7.8 % — SIGNIFICANT CHANGE UP (ref 2–14)
NEUTROPHILS # BLD AUTO: 5.01 K/UL — SIGNIFICANT CHANGE UP (ref 1.8–7.4)
NEUTROPHILS # BLD AUTO: 5.01 K/UL — SIGNIFICANT CHANGE UP (ref 1.8–7.4)
NEUTROPHILS NFR BLD AUTO: 73.8 % — SIGNIFICANT CHANGE UP (ref 43–77)
NEUTROPHILS NFR BLD AUTO: 73.8 % — SIGNIFICANT CHANGE UP (ref 43–77)
NRBC # BLD: 0 /100 WBCS — SIGNIFICANT CHANGE UP (ref 0–0)
NRBC # BLD: 0 /100 WBCS — SIGNIFICANT CHANGE UP (ref 0–0)
PLATELET # BLD AUTO: 214 K/UL — SIGNIFICANT CHANGE UP (ref 150–400)
PLATELET # BLD AUTO: 214 K/UL — SIGNIFICANT CHANGE UP (ref 150–400)
POTASSIUM SERPL-MCNC: 3.8 MMOL/L — SIGNIFICANT CHANGE UP (ref 3.5–5.3)
POTASSIUM SERPL-MCNC: 3.8 MMOL/L — SIGNIFICANT CHANGE UP (ref 3.5–5.3)
POTASSIUM SERPL-SCNC: 3.8 MMOL/L — SIGNIFICANT CHANGE UP (ref 3.5–5.3)
POTASSIUM SERPL-SCNC: 3.8 MMOL/L — SIGNIFICANT CHANGE UP (ref 3.5–5.3)
PROT SERPL-MCNC: 7.7 GM/DL — SIGNIFICANT CHANGE UP (ref 6–8.3)
PROT SERPL-MCNC: 7.7 GM/DL — SIGNIFICANT CHANGE UP (ref 6–8.3)
PROTHROM AB SERPL-ACNC: 18.8 SEC — HIGH (ref 9.5–13)
PROTHROM AB SERPL-ACNC: 18.8 SEC — HIGH (ref 9.5–13)
RBC # BLD: 4.41 M/UL — SIGNIFICANT CHANGE UP (ref 4.2–5.8)
RBC # BLD: 4.41 M/UL — SIGNIFICANT CHANGE UP (ref 4.2–5.8)
RBC # FLD: 14.6 % — HIGH (ref 10.3–14.5)
RBC # FLD: 14.6 % — HIGH (ref 10.3–14.5)
SODIUM SERPL-SCNC: 137 MMOL/L — SIGNIFICANT CHANGE UP (ref 135–145)
SODIUM SERPL-SCNC: 137 MMOL/L — SIGNIFICANT CHANGE UP (ref 135–145)
WBC # BLD: 6.79 K/UL — SIGNIFICANT CHANGE UP (ref 3.8–10.5)
WBC # BLD: 6.79 K/UL — SIGNIFICANT CHANGE UP (ref 3.8–10.5)
WBC # FLD AUTO: 6.79 K/UL — SIGNIFICANT CHANGE UP (ref 3.8–10.5)
WBC # FLD AUTO: 6.79 K/UL — SIGNIFICANT CHANGE UP (ref 3.8–10.5)

## 2023-12-11 PROCEDURE — 99285 EMERGENCY DEPT VISIT HI MDM: CPT

## 2023-12-11 PROCEDURE — 93010 ELECTROCARDIOGRAM REPORT: CPT

## 2023-12-11 RX ORDER — SODIUM CHLORIDE 9 MG/ML
1000 INJECTION INTRAMUSCULAR; INTRAVENOUS; SUBCUTANEOUS ONCE
Refills: 0 | Status: COMPLETED | OUTPATIENT
Start: 2023-12-11 | End: 2023-12-11

## 2023-12-11 RX ADMIN — SODIUM CHLORIDE 1000 MILLILITER(S): 9 INJECTION INTRAMUSCULAR; INTRAVENOUS; SUBCUTANEOUS at 23:23

## 2023-12-11 NOTE — ED PROVIDER NOTE - PATIENT PORTAL LINK FT
You can access the FollowMyHealth Patient Portal offered by [facility] by registering at the following website: [email or web address]. By joining [employer]’s FollowMyHealth portal, you will also be able to view your health information using other applications (apps) compatible with our system. You can access the FollowMyHealth Patient Portal offered by [facility] by registering at the following website: [email or web address]. By joining [employer]’s FollowMyHealth portal, you will also be able to view your health information using other applications (apps) compatible with our system.

## 2023-12-11 NOTE — ED ADULT TRIAGE NOTE - CHIEF COMPLAINT QUOTE
pt a&o x4 ambulatory with ems. pt c.o of 1 episode of dizziness that has now resolved. pmh afib. no palpitations or pain,

## 2023-12-11 NOTE — ED ADULT NURSE NOTE - OBJECTIVE STATEMENT
Pt AAOx4. 68 year old male presents to ED with complaint of one episode of dizziness that has self-resolved. Pt ambulatory with stable gait. Respirations equal and unlabored. No acute distress noted at this time. Denies chest pain, shortness of breath, palpitations, abd pain.

## 2023-12-11 NOTE — ED PROVIDER NOTE - CLINICAL SUMMARY MEDICAL DECISION MAKING FREE TEXT BOX
pt pw dizziness, which is more like near syncope. will check labs and give fluids. As interpreted by ED physician, ECG is NSR with normal intervals/axis, no changes in QRS, no ST/T changes.  anticipate dc home.

## 2023-12-11 NOTE — ED PROVIDER NOTE - CARE PROVIDERS DIRECT ADDRESSES
,[email or web address]

## 2023-12-11 NOTE — ED PROVIDER NOTE - PROGRESS NOTE DETAILS
Results reported to patient--grossly benign, labs and imaging unremarkable   Pt. reports feeling better after meds  pt. agrees to f/u with primary care outpt. asap, additional urgent cardio referral given for f/u   pt. understands to return to ED if symptoms worsen; will d/c as per plan with prior attending or record

## 2023-12-11 NOTE — ED PROVIDER NOTE - OBJECTIVE STATEMENT
68M hx afib xarelto, htn, dm, hld pw dizziness. pt was eating this evening and then felt his vision go dark. he felt like he was going to pass out but never did. he denies cp or sob. mild ha, now resolved. no vomiting.

## 2023-12-11 NOTE — ED ADULT NURSE NOTE - NSFALLUNIVINTERV_ED_ALL_ED
Bed/Stretcher in lowest position, wheels locked, appropriate side rails in place/Call bell, personal items and telephone in reach/Instruct patient to call for assistance before getting out of bed/chair/stretcher/Non-slip footwear applied when patient is off stretcher/[place] to call system/Physically safe environment - no spills, clutter or unnecessary equipment/Purposeful proactive rounding/Room/bathroom lighting operational, light cord in reach Bed/Stretcher in lowest position, wheels locked, appropriate side rails in place/Call bell, personal items and telephone in reach/Instruct patient to call for assistance before getting out of bed/chair/stretcher/Non-slip footwear applied when patient is off stretcher/[place] to call system/Physically safe environment - no spills, clutter or unnecessary equipment/Purposeful proactive rounding/Room/bathroom lighting operational, light cord in reach

## 2023-12-11 NOTE — ED PROVIDER NOTE - CARE PROVIDER_API CALL
Balbir Dunlap  Cardiology  [address] [place] [address]  Phone: [phone]  Fax: [phone]  Follow Up Time: Urgent   Balbir Dunlap  Cardiology  [address] [place] [address]  Phone: [phone]  Fax: [phone]  Follow Up Time: Urgent

## 2023-12-12 VITALS
HEART RATE: 81 BPM | TEMPERATURE: 98 F | DIASTOLIC BLOOD PRESSURE: 82 MMHG | RESPIRATION RATE: 18 BRPM | SYSTOLIC BLOOD PRESSURE: 132 MMHG | OXYGEN SATURATION: 100 %

## 2023-12-12 PROBLEM — C61 MALIGNANT NEOPLASM OF PROSTATE: Chronic | Status: ACTIVE | Noted: 2019-05-17

## 2023-12-12 PROBLEM — E78.5 HYPERLIPIDEMIA, UNSPECIFIED: Chronic | Status: ACTIVE | Noted: 2019-05-17

## 2023-12-12 PROBLEM — H40.9 UNSPECIFIED GLAUCOMA: Chronic | Status: ACTIVE | Noted: 2019-05-17

## 2023-12-12 PROCEDURE — 70450 CT HEAD/BRAIN W/O DYE: CPT | Mod: 26,MA

## 2025-06-17 NOTE — ED ADULT NURSE NOTE - DISCHARGE DATE/TIME
02-Mar-2019 18:04
Assistance with ambulation/Assistance OOB with selected safe patient handling equipment/Communicate Risk of Fall with Harm to all staff/Discuss with provider need for PT consult/Monitor gait and stability/Provide patient with walking aids - walker, cane, crutches/Reinforce activity limits and safety measures with patient and family/Tailored Fall Risk Interventions/Visual Cue: Yellow wristband and red socks/Bed in lowest position, wheels locked, appropriate side rails in place/Call bell, personal items and telephone in reach/Instruct patient to call for assistance before getting out of bed or chair/Non-slip footwear when patient is out of bed/[place] to call system/Physically safe environment - no spills, clutter or unnecessary equipment/Purposeful Proactive Rounding/Room/bathroom lighting operational, light cord in reach